# Patient Record
Sex: FEMALE | Race: WHITE
[De-identification: names, ages, dates, MRNs, and addresses within clinical notes are randomized per-mention and may not be internally consistent; named-entity substitution may affect disease eponyms.]

---

## 2018-12-26 ENCOUNTER — HOSPITAL ENCOUNTER (EMERGENCY)
Dept: HOSPITAL 25 - UCCORT | Age: 33
Discharge: HOME | End: 2018-12-26
Payer: COMMERCIAL

## 2018-12-26 VITALS — DIASTOLIC BLOOD PRESSURE: 62 MMHG | SYSTOLIC BLOOD PRESSURE: 112 MMHG

## 2018-12-26 DIAGNOSIS — B96.20: Primary | ICD-10-CM

## 2018-12-26 PROCEDURE — G0463 HOSPITAL OUTPT CLINIC VISIT: HCPCS

## 2018-12-26 PROCEDURE — 87077 CULTURE AEROBIC IDENTIFY: CPT

## 2018-12-26 PROCEDURE — 81003 URINALYSIS AUTO W/O SCOPE: CPT

## 2018-12-26 PROCEDURE — 99212 OFFICE O/P EST SF 10 MIN: CPT

## 2018-12-26 PROCEDURE — 87186 SC STD MICRODIL/AGAR DIL: CPT

## 2018-12-26 PROCEDURE — 87086 URINE CULTURE/COLONY COUNT: CPT

## 2018-12-26 NOTE — UC
Abdominal Pain Female HPI





- HPI Summary


HPI Summary: 





Patient presents to urgent care reporting 5 days of progressive urinary urgency

, frequency, dysuria.  Patient denies any abdominal pain.  No nausea vomiting.  

No back pain.  No fevers or chills.  Patient with history of UTI states feels 

the same.  Patient without any history of vaginal discharge, itching or odor.  

Patient states she is not pregnant.  Patient denies concern for STI.  Patient's 

medications reviewed this visit. No OTC meds taken to tx sx.  Pt denies h/o 

taking pyridium








- History of Current Complaint


Chief Complaint: UCGU


Stated Complaint: URINARY


Time Seen by Provider: 12/26/18 15:39


Hx Obtained From: Patient


Hx Last Menstrual Period: 12/12/18


Onset/Duration: Gradual Onset


Severity Initially: Mild


Severity Currently: Mild


Pain Intensity: 0


Allergies/Adverse Reactions: 


 Allergies











Allergy/AdvReac Type Severity Reaction Status Date / Time


 


No Known Allergies Allergy   Verified 12/26/18 14:58











Home Medications: 


 Home Medications





ARIPiprazole TAB* [Abilify  TAB*] 1 tab PO DAILY 12/26/18 [History Confirmed 12/ 26/18]











PMH/Surg Hx/FS Hx/Imm Hx


Previously Healthy: Yes





- Surgical History


Surgical History: None





- Social History


Occupation: Employed Full-time


Lives: With Family


Alcohol Use: Occasionally


Substance Use Type: None


Smoking Status (MU): Never Smoked Tobacco





Review of Systems


All Other Systems Reviewed And Are Negative: Yes


Genitourinary: Positive: Dysuria, Frequency, Urgency.  Negative: Vaginal/Penile 

Discharge





Physical Exam





- Summary


Physical Exam Summary: 





Vital Signs Reviewed: Yes


A+Ox3, no distress


Eyes: Conjunctiva Clear, 


ENT: Hearing grossly normal  


Neck: Positive: Supple


Respiratory: Positive: No respiratory distress, No accessory muscle use + CTA 

throughout  no w/r


Cardiovascular: RRR  nl s1, s2  no m/r  CBT <2  sec


abd soft + BS nt/nd  no guarding, no distension, no CVA


Musculoskeletal Exam: MINA x 4 without difficulty 


Neurological: Positive: Alert,  + sensation throughout


Psychological: Positive: Normal Response To Family


Skin: Positive: no rash, no ecchymosis


Vital Signs: 


 Initial Vital Signs











Temp  99.3 F   12/26/18 14:54


 


Pulse  86   12/26/18 14:54


 


Resp  14   12/26/18 14:54


 


BP  112/62   12/26/18 14:54


 


Pulse Ox  100   12/26/18 14:54














Abd Pain Female Course/Dx





- Course


Course Of Treatment: Patient presents with UTI symptoms for 5 days.  Patient 

without any fevers, nausea, or back pain.  Patient with history of similar 

states feels the same.  Last antibiotic  > 1 year.   Patient's urinalysis 

consistent with UTI.  We'll give patient prescription for Pyridium as well as 

Bactrim.  Patient will be cultured.  Motrin Tylenol.  Hydrate.  Return 

precautions.





- Differential Dx/Diagnosis


Provider Diagnosis: 


 UTI (urinary tract infection)








Discharge





- Sign-Out/Discharge


Documenting (check all that apply): Patient Departure


All imaging exams completed and their final reports reviewed: No Studies





- Discharge Plan


Condition: Stable


Disposition: HOME


Prescriptions: 


Phenazopyridine TAB* [Pyridium 100 mg TAB*] 100 mg PO TID PRN #9 tab


 PRN Reason: burning with urination


Sulfamethox/Trimethoprim DS* [Bactrim /160 TAB*] 1 tab PO BID #14 tab


Patient Education Materials:  Urinary Tract Infection in Women (ED)


Referrals: 


Tyra Suárez MD [Primary Care Provider] - 


Additional Instructions: 


- stay well hydrated - drink plenty of non-alcoholic, non caffinated beverages


- your urine will be further tested - if you require any changes to your 

treatment, we will contact you - this usually take 2 days


- Contact your primary doctor to arrange a follow-up appointment next week. 

Contact your doctor or return with questions or concerns


- Take your antibiotics exactly as prescribed until gone


- Take pyridium as prescribed for discomfort. This will make your urine blaze 

orange  - this is normal 


- Okay to alternate ibuprofen (Advil, Motrin) and Tylenol every 3 hours for 

pain. Take with food


- Call your doctor or return with questions or concerns


   





- Billing Disposition and Condition


Condition: STABLE


Disposition: Home